# Patient Record
Sex: MALE | Race: WHITE | NOT HISPANIC OR LATINO | URBAN - NONMETROPOLITAN AREA
[De-identification: names, ages, dates, MRNs, and addresses within clinical notes are randomized per-mention and may not be internally consistent; named-entity substitution may affect disease eponyms.]

---

## 2019-02-19 ENCOUNTER — IMPORTED ENCOUNTER (OUTPATIENT)
Dept: URBAN - NONMETROPOLITAN AREA CLINIC 1 | Facility: CLINIC | Age: 69
End: 2019-02-19

## 2019-02-19 PROBLEM — H25.813: Noted: 2019-02-19

## 2019-02-19 PROCEDURE — 92002 INTRM OPH EXAM NEW PATIENT: CPT

## 2019-02-19 PROCEDURE — 92015 DETERMINE REFRACTIVE STATE: CPT

## 2019-02-19 NOTE — PATIENT DISCUSSION
Cataract OU-Not yet surgical. -Reviewed symptoms of advancing cataract growth such as glare and halos and decreased vision.-Continue to monitor for now. Pt will notify us if any new symptoms develop. RTC 1 yr CEE

## 2020-02-20 ENCOUNTER — IMPORTED ENCOUNTER (OUTPATIENT)
Dept: URBAN - NONMETROPOLITAN AREA CLINIC 1 | Facility: CLINIC | Age: 70
End: 2020-02-20

## 2020-02-20 PROBLEM — H25.813: Noted: 2019-02-19

## 2020-02-20 PROBLEM — H52.03: Noted: 2020-02-20

## 2020-02-20 PROCEDURE — 92015 DETERMINE REFRACTIVE STATE: CPT

## 2020-02-20 PROCEDURE — 99213 OFFICE O/P EST LOW 20 MIN: CPT

## 2022-04-15 ASSESSMENT — VISUAL ACUITY
OS_SC: 20/30
OD_SC: 20/25
OU_CC: J1

## 2022-04-15 ASSESSMENT — TONOMETRY
OD_IOP_MMHG: 18
OS_IOP_MMHG: 17
OD_IOP_MMHG: 17
OS_IOP_MMHG: 18

## 2022-07-26 ENCOUNTER — ESTABLISHED PATIENT (OUTPATIENT)
Dept: RURAL CLINIC 2 | Facility: CLINIC | Age: 72
End: 2022-07-26

## 2022-07-26 DIAGNOSIS — H25.813: ICD-10-CM

## 2022-07-26 DIAGNOSIS — H52.03: ICD-10-CM

## 2022-07-26 DIAGNOSIS — H52.4: ICD-10-CM

## 2022-07-26 PROCEDURE — 92014 COMPRE OPH EXAM EST PT 1/>: CPT

## 2022-07-26 PROCEDURE — 92015 DETERMINE REFRACTIVE STATE: CPT

## 2022-07-26 ASSESSMENT — TONOMETRY
OD_IOP_MMHG: 13
OS_IOP_MMHG: 14

## 2022-07-26 ASSESSMENT — VISUAL ACUITY
OD_PH: 20/25
OD_SC: 20/200
OS_PH: 20/30
OS_SC: 20/60

## 2022-07-26 NOTE — PATIENT DISCUSSION
Cataract OU-Reviewed symptoms of advancing cataract growth such as glare and halos and decreased vision.-Continue to monitor for now. Pt will notify us if any new symptoms develop.-No Rx dispensed today RTC 1 yr CEE. none

## 2023-07-25 ENCOUNTER — ESTABLISHED PATIENT (OUTPATIENT)
Dept: RURAL CLINIC 2 | Facility: CLINIC | Age: 73
End: 2023-07-25

## 2023-07-25 DIAGNOSIS — H43.812: ICD-10-CM

## 2023-07-25 DIAGNOSIS — H52.223: ICD-10-CM

## 2023-07-25 DIAGNOSIS — H04.223: ICD-10-CM

## 2023-07-25 DIAGNOSIS — H25.813: ICD-10-CM

## 2023-07-25 DIAGNOSIS — H52.03: ICD-10-CM

## 2023-07-25 DIAGNOSIS — H52.4: ICD-10-CM

## 2023-07-25 PROCEDURE — 92015 DETERMINE REFRACTIVE STATE: CPT

## 2023-07-25 PROCEDURE — 99214 OFFICE O/P EST MOD 30 MIN: CPT

## 2023-07-25 ASSESSMENT — TONOMETRY
OS_IOP_MMHG: 17
OD_IOP_MMHG: 16

## 2023-07-25 ASSESSMENT — VISUAL ACUITY
OS_CC: 20/20
OD_CC: 20/20

## 2023-12-28 NOTE — PATIENT DISCUSSION
Cataract OU-Reviewed symptoms of advancing cataract growth such as glare and halos and decreased vision.-Continue to monitor for now.  Pt will notify us if any new symptoms develop.-No Rx dispensed today RTC 1 yr CEE
no

## 2024-10-09 ENCOUNTER — COMPREHENSIVE EXAM (OUTPATIENT)
Dept: RURAL CLINIC 2 | Facility: CLINIC | Age: 74
End: 2024-10-09

## 2024-10-09 DIAGNOSIS — H25.813: ICD-10-CM

## 2024-10-09 DIAGNOSIS — H43.813: ICD-10-CM

## 2024-10-09 DIAGNOSIS — H52.03: ICD-10-CM

## 2024-10-09 DIAGNOSIS — H52.4: ICD-10-CM

## 2024-10-09 DIAGNOSIS — H04.223: ICD-10-CM

## 2024-10-09 PROCEDURE — 92015 DETERMINE REFRACTIVE STATE: CPT

## 2024-10-09 PROCEDURE — 92014 COMPRE OPH EXAM EST PT 1/>: CPT

## 2024-10-09 PROCEDURE — 92134 CPTRZ OPH DX IMG PST SGM RTA: CPT
